# Patient Record
Sex: FEMALE | NOT HISPANIC OR LATINO | Employment: OTHER | URBAN - NONMETROPOLITAN AREA
[De-identification: names, ages, dates, MRNs, and addresses within clinical notes are randomized per-mention and may not be internally consistent; named-entity substitution may affect disease eponyms.]

---

## 2018-05-09 ENCOUNTER — PREP FOR SURGERY (OUTPATIENT)
Dept: OTHER | Facility: HOSPITAL | Age: 63
End: 2018-05-09

## 2018-05-09 ENCOUNTER — OFFICE VISIT (OUTPATIENT)
Dept: GASTROENTEROLOGY | Facility: CLINIC | Age: 63
End: 2018-05-09

## 2018-05-09 VITALS
RESPIRATION RATE: 18 BRPM | TEMPERATURE: 98.7 F | HEIGHT: 61 IN | DIASTOLIC BLOOD PRESSURE: 79 MMHG | WEIGHT: 134 LBS | SYSTOLIC BLOOD PRESSURE: 112 MMHG | HEART RATE: 71 BPM | BODY MASS INDEX: 25.3 KG/M2

## 2018-05-09 DIAGNOSIS — K92.1 MELENA: ICD-10-CM

## 2018-05-09 DIAGNOSIS — R10.30 LOWER ABDOMINAL PAIN: ICD-10-CM

## 2018-05-09 DIAGNOSIS — Z12.11 COLON CANCER SCREENING: ICD-10-CM

## 2018-05-09 DIAGNOSIS — Z12.11 COLON CANCER SCREENING: Primary | ICD-10-CM

## 2018-05-09 DIAGNOSIS — R12 HEARTBURN: ICD-10-CM

## 2018-05-09 DIAGNOSIS — R10.13 EPIGASTRIC PAIN: Primary | ICD-10-CM

## 2018-05-09 DIAGNOSIS — R11.0 NAUSEA: ICD-10-CM

## 2018-05-09 DIAGNOSIS — K62.89 ANAL OR RECTAL PAIN: ICD-10-CM

## 2018-05-09 DIAGNOSIS — R19.7 DIARRHEA, UNSPECIFIED TYPE: ICD-10-CM

## 2018-05-09 DIAGNOSIS — R12 HEARTBURN: Primary | ICD-10-CM

## 2018-05-09 DIAGNOSIS — R10.13 EPIGASTRIC PAIN: ICD-10-CM

## 2018-05-09 DIAGNOSIS — K62.89 RECTAL PAIN: ICD-10-CM

## 2018-05-09 PROCEDURE — 99244 OFF/OP CNSLTJ NEW/EST MOD 40: CPT | Performed by: NURSE PRACTITIONER

## 2018-05-09 RX ORDER — TRIAMTERENE AND HYDROCHLOROTHIAZIDE 37.5; 25 MG/1; MG/1
1 TABLET ORAL DAILY PRN
COMMUNITY

## 2018-05-09 RX ORDER — SODIUM CHLORIDE 9 MG/ML
70 INJECTION, SOLUTION INTRAVENOUS CONTINUOUS PRN
Status: CANCELLED | OUTPATIENT
Start: 2018-05-09

## 2018-05-09 RX ORDER — RANITIDINE 150 MG/1
150 TABLET ORAL DAILY
COMMUNITY

## 2018-05-09 RX ORDER — PREDNISONE 10 MG/1
8-10 TABLET ORAL TAKE AS DIRECTED
COMMUNITY

## 2018-05-09 RX ORDER — ROPINIROLE 1 MG/1
1 TABLET, FILM COATED ORAL NIGHTLY
COMMUNITY

## 2018-05-09 RX ORDER — SERTRALINE HYDROCHLORIDE 100 MG/1
100 TABLET, FILM COATED ORAL DAILY
COMMUNITY

## 2018-05-09 RX ORDER — ALBUTEROL SULFATE 1.25 MG/3ML
1 SOLUTION RESPIRATORY (INHALATION) EVERY 6 HOURS PRN
COMMUNITY

## 2018-05-09 NOTE — PROGRESS NOTES
"Chief Complaint   Patient presents with   • Colon Cancer Screening   • Diarrhea   • Abdominal Pain   • Nausea   • Vomiting   • Black or Bloody Stool   • Heartburn     There is a long-standing history of diarrhea. The patient may have diarrhea 3-4 times per day. Eating makes the diarrhea worse. The patient does wake at times with diarrhea. The patient has urgency, and at times may have incontinence of bowel as she \"cannot make it\" to the restroom in time. The patient does not take anything for diarrhea. The patient denies bright red blood per rectum.     There is a history of rectal pain for the past 2-3 months. The pain is described as a pressure. The pain can be severe. The pain only occurs during the night when she is asleep and it will wake her up as it is very painful.    The patient has a long-standing history of lower abdominal pain. The pain is severe. It is a sharp pain. The pain may occur 1-2 times per day and can last a few minutes. Eating does not affect the pain. Nothing makes the pain better and nothing makes the pain worse.    There is a history of nausea for the past 2-3 months. The nausea is usually worse at night, right before she lays down. She will drink a glass of water and this can help with nausea. She has thrown up \"brown mush\" 2-3 times over the past 2-3 months. The patient denies bright red blood or coffee ground emesis. She has had 2-3 episodes of very dark stools in the past.    There is a long-standing history of heartburn. Heartburn is well controlled with Ranitidine. Heartburn is severe. It does wake her up at night. She does drink at least 1 liter of Mt. Dew per day, and at times she will drink a 2 liter of Mt. Dew daily. No history of difficulty swallowing.     The patient's last colonoscopy was over 45 years ago. There is no family history of colon cancer.    Diarrhea    This is a chronic problem. The current episode started more than 1 year ago. The problem occurs 2 to 4 times per " day. The problem has been unchanged. Diarrhea characteristics: loose/watery. The patient states that diarrhea awakens her from sleep. Associated symptoms include abdominal pain and vomiting. Pertinent negatives include no arthralgias, chills, coughing, fever, headaches or myalgias. Nothing aggravates the symptoms. There are no known risk factors. She has tried nothing for the symptoms.   Abdominal Pain   This is a chronic problem. Episode onset: over 3 years. The pain is located in the suprapubic region. The pain is severe. The quality of the pain is sharp and cramping. The abdominal pain does not radiate. Associated symptoms include diarrhea, nausea and vomiting. Pertinent negatives include no arthralgias, constipation, dysuria, fever, headaches, hematuria or myalgias. Nothing aggravates the pain. The pain is relieved by nothing. She has tried nothing for the symptoms. Her past medical history is significant for GERD.   Nausea   This is a recurrent problem. Episode onset: March 2018. The problem occurs intermittently. The problem has been unchanged. Associated symptoms include abdominal pain, fatigue, joint swelling, nausea and vomiting. Pertinent negatives include no arthralgias, chest pain, chills, coughing, fever, headaches, myalgias or rash. Nothing aggravates the symptoms. She has tried nothing for the symptoms.   Heartburn   She complains of abdominal pain, heartburn, nausea and wheezing. She reports no chest pain or no coughing. This is a chronic problem. Episode onset: over 20 years. The problem occurs occasionally. The problem has been unchanged. The heartburn duration is an hour. The heartburn is located in the substernum. The heartburn is of severe intensity. The heartburn wakes her from sleep. Nothing aggravates the symptoms. Associated symptoms include fatigue. Risk factors include caffeine use. She has tried a histamine-2 antagonist for the symptoms. The treatment provided significant relief.   Rectal  Pain   This is a recurrent problem. The current episode started more than 1 month ago. The problem occurs intermittently. The problem has been unchanged. Associated symptoms include abdominal pain, fatigue, joint swelling, nausea and vomiting. Pertinent negatives include no arthralgias, chest pain, chills, coughing, fever, headaches, myalgias or rash. Nothing aggravates the symptoms. She has tried nothing for the symptoms.     Review of Systems   Constitutional: Positive for fatigue. Negative for appetite change, chills, fever and unexpected weight change.   HENT: Negative for mouth sores, nosebleeds and trouble swallowing.    Eyes: Negative for discharge and redness.   Respiratory: Positive for apnea, chest tightness, shortness of breath and wheezing. Negative for cough.    Cardiovascular: Negative for chest pain, palpitations and leg swelling.   Gastrointestinal: Positive for abdominal distention, abdominal pain, diarrhea, heartburn, nausea, rectal pain and vomiting. Negative for anal bleeding, blood in stool and constipation.   Endocrine: Negative for cold intolerance, heat intolerance and polydipsia.   Genitourinary: Negative for dysuria, hematuria and urgency.   Musculoskeletal: Positive for back pain and joint swelling. Negative for arthralgias and myalgias.   Skin: Negative for rash.   Allergic/Immunologic: Negative for food allergies and immunocompromised state.   Neurological: Positive for syncope and light-headedness. Negative for dizziness, seizures and headaches.   Hematological: Negative for adenopathy. Does not bruise/bleed easily.   Psychiatric/Behavioral: Negative for dysphoric mood. The patient is not nervous/anxious and is not hyperactive.      Patient Active Problem List   Diagnosis   • Epigastric pain   • Nausea   • Heartburn   • Melena   • Diarrhea   • Anal or rectal pain   • Colon cancer screening   • Lower abdominal pain     Past Medical History:   Diagnosis Date   • Asthma    • Back pain    •  "Colitis    • COPD (chronic obstructive pulmonary disease)    • Depression    • Diabetes    • GERD (gastroesophageal reflux disease)    • Restless leg syndrome      Past Surgical History:   Procedure Laterality Date   • COLONOSCOPY  over 45 years ago   • HYSTERECTOMY  1984     Family History   Problem Relation Age of Onset   • Hiatal hernia Mother    • Colon cancer Neg Hx      Social History   Substance Use Topics   • Smoking status: Current Every Day Smoker     Types: Cigarettes   • Smokeless tobacco: Never Used   • Alcohol use No       Current Outpatient Prescriptions:   •  albuterol (ACCUNEB) 1.25 MG/3ML nebulizer solution, Take 1 ampule by nebulization Every 6 (Six) Hours As Needed for Wheezing., Disp: , Rfl:   •  Fluticasone Furoate-Vilanterol (BREO ELLIPTA IN), Inhale., Disp: , Rfl:   •  O2 (OXYGEN), Inhale Continuous., Disp: , Rfl:   •  predniSONE (DELTASONE) 10 MG tablet, Take 10 mg by mouth As Needed., Disp: , Rfl:   •  raNITIdine (ZANTAC) 150 MG tablet, Take 150 mg by mouth Daily., Disp: , Rfl:   •  rOPINIRole (REQUIP) 1 MG tablet, Take 1 mg by mouth Every Night. Take 1 hour before bedtime., Disp: , Rfl:   •  sertraline (ZOLOFT) 100 MG tablet, Take 100 mg by mouth Daily., Disp: , Rfl:   •  Tiotropium Bromide Monohydrate (SPIRIVA HANDIHALER IN), Inhale., Disp: , Rfl:   •  triamterene-hydrochlorothiazide (MAXZIDE-25) 37.5-25 MG per tablet, Take 1 tablet by mouth Daily., Disp: , Rfl:     No Known Allergies     /79   Pulse 71   Temp 98.7 °F (37.1 °C)   Resp 18   Ht 154.9 cm (61\")   Wt 60.8 kg (134 lb)   BMI 25.32 kg/m²     Physical Exam   Constitutional: She is oriented to person, place, and time. She appears well-developed and well-nourished. No distress.   HENT:   Head: Normocephalic and atraumatic.   Right Ear: Hearing and external ear normal.   Left Ear: Hearing and external ear normal.   Nose: Nose normal.   Mouth/Throat: Oropharynx is clear and moist and mucous membranes are normal. Mucous " membranes are not pale, not dry and not cyanotic. No oral lesions. Abnormal dentition (edentulous). No oropharyngeal exudate.   Eyes: Conjunctivae and EOM are normal. Right eye exhibits no discharge. Left eye exhibits no discharge.   Neck: Trachea normal. Neck supple. No JVD present. No edema present. No thyroid mass and no thyromegaly present.   Cardiovascular: Normal rate, regular rhythm, S2 normal and normal heart sounds.  Exam reveals no gallop, no S3 and no friction rub.    No murmur heard.  Pulmonary/Chest: Effort normal. No respiratory distress (patient uses oxygen at 2L NC). She has wheezes in the right upper field, the right lower field, the left upper field and the left lower field. She exhibits no tenderness.   Abdominal: Normal appearance and bowel sounds are normal. She exhibits no distension, no ascites and no mass. There is no splenomegaly or hepatomegaly. There is tenderness (moderate) in the epigastric area and suprapubic area. There is no rigidity, no rebound and no guarding. No hernia.     Vascular Status -  Her right foot exhibits no edema. Her left foot exhibits no edema.  Lymphadenopathy:     She has no cervical adenopathy.        Left: No supraclavicular adenopathy present.   Neurological: She is alert and oriented to person, place, and time. She has normal strength. No cranial nerve deficit or sensory deficit.   Skin: No rash noted. She is not diaphoretic. No cyanosis. No pallor. Nails show no clubbing.   Psychiatric: She has a normal mood and affect.   Nursing note and vitals reviewed.  Stigmata of chronic liver disease:  None.  Asterixis:  None.    Laboratory Tests:   Upon review of records:     Dated 3/20/2018 glucose 90 BUN 15 creatinine 1.01 sodium 139 potassium 4.5 chloride 105 CO2 27 calcium 9.6 albumin 4.8 total bilirubin 0.4 upon phosphatase 103 AST 16 ALT 12 WBC 7.4 hemoglobin 15.5 hematocrit 46.1 platelet count 361 MCV 87.1 Iron 84 TIBC 373 saturation 23% vitamin B12  333    Assessment:      ICD-10-CM ICD-9-CM   1. Epigastric pain R10.13 789.06   2. Nausea R11.0 787.02   3. Heartburn R12 787.1   4. Melena K92.1 578.1   5. Diarrhea, unspecified type R19.7 787.91   6. Anal or rectal pain K62.89 569.42   7. Lower abdominal pain R10.30 789.09   8. Colon cancer screening Z12.11 V76.51     Discussion:  1. Differentials for epigastric pain and nausea include peptic ulcer disease, pancreatic biliary disease.  2. History of long-standing heartburn.  Controlled with Zantac.  Concerns for underlying Tinajero's.  3. Differentials for melena include peptic ulcer disease.  4. History of long-standing diarrhea.  Differentials include microscopic colitis, underlying inflammatory bowel disease.  5. Rectal pain differentials include hemorrhoids, fissure, underlying colorectal neoplastic process.  6. Long-standing history of lower abdominal pain.  Etiology unclear.  Differentials include underlying inflammatory bowel disease.    Plan/  Patient Instructions   1. Antireflux measures: Avoid fried, fatty foods, alcohol, chocolate, coffee, tea,  soft drinks, peppermint and spearmint, spicy foods, tomatoes and tomato based foods, onion based foods, and smoking. Other antireflux measures include weight reduction if overweight, avoiding tight clothing around the abdomen, elevating the head of the bed 6 inches with blocks under the head board, and don't drink or eat before going to bed and avoid lying down immediately after meals.  2. Ranitidine 150 mg 1 po twice a day.  3. Upper endoscopy-EGD: Description of the procedure, risks, benefits, alternatives and options, including nonoperative options, were discussed with the patient in detail. The patient understands and wishes to proceed.  4. Colonoscopy: Description of the procedure, risks, benefits, alternatives and options, including nonoperative options, were discussed with the patient in detail. The patient understands and wishes to proceed.     Sudhakar URIBE  Carter, APRN

## 2018-05-11 ENCOUNTER — HOSPITAL ENCOUNTER (OUTPATIENT)
Facility: HOSPITAL | Age: 63
Setting detail: HOSPITAL OUTPATIENT SURGERY
End: 2018-05-11
Attending: INTERNAL MEDICINE | Admitting: INTERNAL MEDICINE

## 2018-05-11 PROBLEM — K62.89 RECTAL PAIN: Status: ACTIVE | Noted: 2018-05-11

## 2018-05-21 ENCOUNTER — TELEPHONE (OUTPATIENT)
Dept: GASTROENTEROLOGY | Facility: CLINIC | Age: 63
End: 2018-05-21

## 2018-05-21 VITALS — HEIGHT: 61 IN | BODY MASS INDEX: 23.98 KG/M2 | WEIGHT: 127 LBS

## 2018-05-21 NOTE — TELEPHONE ENCOUNTER
Called patient - per Preadmission Testing patient needs cardiac clearance. Confirmed to patient procedures cancelled for 5/25 and 6/11/18. She will call us back to schedule office appointment after getting cardiac clearance.

## 2018-05-21 NOTE — DISCHARGE INSTRUCTIONS
DO NOT EAT, DRINK, SMOKE, USE SMOKELESS TOBACCO OR CHEW GUM AFTER MIDNIGHT THE NIGHT BEFORE SURGERY.  THIS ALSO INCLUDES HARD CANDIES AND MINTS.    DO NOT SHAVE THE AREA TO BE OPERATED ON AT LEAST 48 HOURS PRIOR TO THE PROCEDURE.  DO NOT WEAR MAKE UP OR NAIL POLISH.  DO NOT LEAVE IN ANY PIERCING OR WEAR JEWELRY THE DAY OF SURGERY.      DO NOT USE ADHESIVES IF YOU WEAR DENTURES.    DO NOT WEAR EYE CONTACTS; BRING IN YOUR GLASSES.    ONLY TAKE MEDICATION THE MORNING OF YOUR PROCEDURE IF INSTRUCTED BY YOUR SURGEON WITH ENOUGH WATER TO SWALLOW THE MEDICATION.  IF YOUR SURGEON DID NOT SPECIFY WHICH MEDICATIONS TO TAKE, YOU WILL NEED TO CALL THEIR OFFICE FOR FURTHER INSTRUCTIONS AND DO AS THEY INSTRUCT.    LEAVE ANYTHING YOU CONSIDER VALUABLE AT HOME.    YOU WILL NEED TO ARRANGE FOR SOMEONE TO DRIVE YOU HOME AFTER SURGERY.  IT IS RECOMMENDED THAT YOU DO NOT DRIVE, WORK, DRINK ALCOHOL OR MAKE MAJOR DECISIONS FOR AT LEAST 24 HOURS AFTER YOUR PROCEDURE IS COMPLETE.      THE DAY OF YOUR PROCEDURE, BRING IN THE FOLLOWING IF APPLICABLE:   PICTURE ID AND INSURANCE/MEDICARE OR MEDICAID CARDS   COPY OF ADVANCED DIRECTIVE/LIVING WILL/POWER OR    CPAP/BIPAP/INHALERS   SKIN PREP SHEET   YOUR PREADMISSION TESTING PASS (IF NOT A PHONE HISTORY)        PATIENT INSTRUCTED DURING HEALTH HISTORY TO FOLLOW COLON PREP INSTRUCTIONS AS GIVEN BY DR REYNAGA'S OFFICE.     PATIENT INSTRUCTED TO NOTIFY PREOPERATIVE NURSE WHEN THEY HAVE HAD A BOWEL MOVEMENT, AS THE PREOPERATIVE NURSE HAS TO VISUALIZE IT.  PATIENT INSTRUCTED NOT TO FLUSH COMMODE IN PREOPERATIVE PHASE UNLESS THE NURSE HAD VISUALIZED.  PATIENT INSTRUCTED THAT THE BOWEL MOVEMENT SHOULD BE CLEAR YELLOW LIQUID, NOT CLOUDY.     ***PATIENT INSTRUCTED TO BRING IN PORTABLE OXYGEN AND BIPAP DAY OF PROCEDURE***